# Patient Record
Sex: FEMALE | Race: AMERICAN INDIAN OR ALASKA NATIVE
[De-identification: names, ages, dates, MRNs, and addresses within clinical notes are randomized per-mention and may not be internally consistent; named-entity substitution may affect disease eponyms.]

---

## 2019-12-29 ENCOUNTER — HOSPITAL ENCOUNTER (EMERGENCY)
Dept: HOSPITAL 5 - ED | Age: 44
Discharge: HOME | End: 2019-12-29
Payer: SELF-PAY

## 2019-12-29 VITALS — SYSTOLIC BLOOD PRESSURE: 110 MMHG | DIASTOLIC BLOOD PRESSURE: 71 MMHG

## 2019-12-29 DIAGNOSIS — J11.1: Primary | ICD-10-CM

## 2019-12-29 DIAGNOSIS — Z90.710: ICD-10-CM

## 2019-12-29 DIAGNOSIS — Z79.899: ICD-10-CM

## 2019-12-29 DIAGNOSIS — F17.200: ICD-10-CM

## 2019-12-29 PROCEDURE — 71046 X-RAY EXAM CHEST 2 VIEWS: CPT

## 2019-12-29 NOTE — EMERGENCY DEPARTMENT REPORT
Upper Respiratory HPI





- HPI


Chief Complaint: Upper Respiratory Infection


Stated Complaint: FLU SX


Time Seen by Provider: 12/29/19 11:41


Duration: 4 Days


URI Symptoms: Rhinorrhea: Yes, Sore Throat: No, Ear Pain: No, Cough: Yes, 

Shortness of Breath: No, Sick Contacts: No, Unable to Take Fluids: No, Urine 

Output Abnormal: No, Listless Behavior: No


Other History: This is a 44-year-old female nontoxic, well nourished in 

appearance, no acute signs of distress presents to the ED with c/o of productive

cough, fever, chills, body aches, rhinorrhea, nasal congestion x4 days.  Patient

describes productive cough as yellow mucus production.  Patient denies any sick 

contacts.  Patient denies any recent travels, long car, recent hospital stays.  

Patient denies any calf pain or calf tenderness.  Patient denies any chest pain,

short of breath, fever, chills, nausea, vomiting, hemoptysis, numbness, 

tingling, headache or stiff neck.  Patient denies any allergies to significant 

past medical history.





- Home Meds and Allergies


Home Medications: 


                                  Previous Rx's











 Medication  Instructions  Recorded  Last Taken  Type


 


Acetaminophen/Codeine [Tylenol #3] 1 tab PO Q6H PRN #20 tab 11/06/15 Unknown Rx


 


Cyclobenzaprine [Flexeril] 10 mg PO TID PRN #20 tablet 11/06/15 Unknown Rx


 


Benzonatate [Tessalon Perles] 100 mg PO Q8HR PRN #20 capsule 12/29/19 Unknown Rx


 


Ibuprofen [Motrin] 600 mg PO Q8H PRN #20 tablet 12/29/19 Unknown Rx











Allergies/Adverse Reactions: 


                                    Allergies











Allergy/AdvReac Type Severity Reaction Status Date / Time


 


No Known Allergies Allergy   Verified 12/29/19 07:23














ED Review of Systems


ROS: 


Stated complaint: FLU SX


Other details as noted in HPI





Constitutional: denies: chills, fever


Eyes: denies: eye pain, eye discharge, vision change


ENT: congestion.  denies: ear pain, throat pain


Respiratory: cough.  denies: shortness of breath, wheezing


Cardiovascular: denies: chest pain, palpitations


Endocrine: no symptoms reported


Gastrointestinal: denies: abdominal pain, nausea, diarrhea


Genitourinary: denies: urgency, dysuria, discharge


Musculoskeletal: denies: back pain, joint swelling, arthralgia


Skin: denies: rash, lesions


Neurological: denies: headache, weakness, paresthesias


Psychiatric: denies: anxiety, depression


Hematological/Lymphatic: denies: easy bleeding, easy bruising





ED Past Medical Hx





- Past Medical History


Previous Medical History?: No





- Surgical History


Additional Surgical History: Hysterectomy, D&C





- Social History


Smoking Status: Current Every Day Smoker


Substance Use Type: None





- Medications


Home Medications: 


                                Home Medications











 Medication  Instructions  Recorded  Confirmed  Last Taken  Type


 


Acetaminophen/Codeine [Tylenol #3] 1 tab PO Q6H PRN #20 tab 11/06/15  Unknown Rx


 


Cyclobenzaprine [Flexeril] 10 mg PO TID PRN #20 tablet 11/06/15  Unknown Rx


 


Benzonatate [Tessalon Perles] 100 mg PO Q8HR PRN #20 capsule 12/29/19  Unknown 

Rx


 


Ibuprofen [Motrin] 600 mg PO Q8H PRN #20 tablet 12/29/19  Unknown Rx














ED Bronchiolitis Physical Exam





- Exam


General: 


Vital signs noted. No distress. Alert and acting appropriately.





Neurologic: 


Alert and oriented, no deficits.








Musculoskeletal: 


Unremarkable.











ED Physical Exam





- General


Limitations: No Limitations


General appearance: alert, in no apparent distress





- Head


Head exam: Present: atraumatic, normocephalic





- ENT


ENT exam: Present: normal exam, normal orophraynx





- Neck


Neck exam: Present: normal inspection, full ROM.  Absent: tenderness, 

meningismus, lymphadenopathy





- Respiratory


Respiratory exam: Present: normal lung sounds bilaterally.  Absent: respiratory 

distress, wheezes, rales, rhonchi, stridor, chest wall tenderness, accessory 

muscle use, decreased breath sounds, prolonged expiratory





- Cardiovascular


Cardiovascular Exam: Present: regular rate, normal rhythm, normal heart sounds. 

 Absent: bradycardia, tachycardia, irregular rhythm, systolic murmur, diastolic 

murmur, rubs, gallop





- Extremities Exam


Extremities exam: Present: normal inspection, full ROM





- Back Exam


Back exam: Present: normal inspection, full ROM.  Absent: tenderness, CVA 

tenderness (R), CVA tenderness (L), muscle spasm, paraspinal tenderness, 

vertebral tenderness, rash noted





- Neurological Exam


Neurological exam: Present: alert, oriented X3, normal gait





- Psychiatric


Psychiatric exam: Present: normal affect, normal mood





- Skin


Skin exam: Present: warm, dry, intact, normal color.  Absent: rash





ED Course


                                   Vital Signs











  12/29/19 12/29/19





  07:25 11:45


 


Temperature 98.9 F 98.7 F


 


Pulse Rate 88 89


 


Respiratory 18 16





Rate  


 


Blood Pressure 92/66 


 


Blood Pressure  110/71





[Left]  


 


O2 Sat by Pulse 100 98





Oximetry  














- Reevaluation(s)


Reevaluation #1: 





12/29/19 12:50


Patient is speaking in full sentences with no signs of distress noted.





ED Medical Decision Making





- Medical Decision Making





This is a 44-year-old female that presents with influenza like symptoms.  

Patient is stable and was examined by me.  Chest x-ray has been obtained and 

dictated by radiologist with normal exam.  Patient is notified of x-ray results 

with no questions noted.  Patient is currently passed the time time a flu.  

Patient was instructed to supportive care.  Patient was instructed to increase 

hydration, rest and take Motrin for fever episodes.  Patient received motrin and

 tesslone perrls in the ED.  Vitals stable. Patient is nonfebrile and normal 

heart rate. Patient was instructed Follow-up with a primary care doctor in 3-5 

days or if symptoms worsen and continue return to emergency room as soon as 

possible.  At time time of discharge, the patient does not seem toxic or ill in 

appearance.  No acute signs of distress noted.  Patient agrees to discharge 

treatment plan of care.  No further questions noted by the patient.


Critical care attestation.: 


If time is entered above; I have spent that time in minutes in the direct care 

of this critically ill patient, excluding procedure time.








ED Disposition


Clinical Impression: 


 Influenza





Disposition: DC-01 TO HOME OR SELFCARE


Is pt being admited?: No


Does the pt Need Aspirin: No


Condition: Stable


Instructions:  Influenza (ED)


Additional Instructions: 


Follow-up with a primary care doctor in 3-5 days or if symptoms worsen and 

continue return to emergency room as soon as possible. 





Increased rest, hydration, and take Motrin/Tylenol as prescribed for fever 

episode.


Prescriptions: 


Ibuprofen [Motrin] 600 mg PO Q8H PRN #20 tablet


 PRN Reason: Pain


Benzonatate [Tessalon Perles] 100 mg PO Q8HR PRN #20 capsule


 PRN Reason: Cough


Referrals: 


PRIMARY MD [facility] [Primary Care Provider] - 3-5 Days


ANTONY CARSON MD [Staff Physician] - 3-5 Days


[facility] [Outside] - 3-5 Days


Forms:  Work/School Release Form(ED)

## 2019-12-29 NOTE — XRAY REPORT
CHEST PA AND LATERAL VIEWS



INDICATION: 

MAIN: cough FOR 5 DAYS.



COMPARISON: 

None.



FINDINGS:



Support devices: None.



Heart: Within normal limits. 



Lungs/Pleura: No consolidation is seen. There is mild peribronchial cuffing Route. No pleural abnorma
lity.  







IMPRESSION:

1. Peribronchial cuffing bilaterally can be seen in the setting of lower airways disease, correlate c
linically. No consolidation is seen.



Signer Name: Yuri Montgomery MD 

Signed: 12/29/2019 12:06 PM

 Workstation Name: [id number]

## 2022-09-04 ENCOUNTER — HOSPITAL ENCOUNTER (EMERGENCY)
Dept: HOSPITAL 5 - ED | Age: 47
Discharge: HOME | End: 2022-09-04
Payer: COMMERCIAL

## 2022-09-04 VITALS — DIASTOLIC BLOOD PRESSURE: 80 MMHG | SYSTOLIC BLOOD PRESSURE: 152 MMHG

## 2022-09-04 DIAGNOSIS — S39.012A: Primary | ICD-10-CM
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
## 2022-09-04 DIAGNOSIS — E11.9: ICD-10-CM
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
## 2022-09-04 DIAGNOSIS — F17.200: ICD-10-CM

## 2022-09-04 PROCEDURE — 99283 EMERGENCY DEPT VISIT LOW MDM: CPT

## 2022-09-04 PROCEDURE — 96372 THER/PROPH/DIAG INJ SC/IM: CPT

## 2022-09-04 NOTE — EMERGENCY DEPARTMENT REPORT
ED Back Pain/Injury HPI





- General


Chief Complaint: Back Pain/Injury


Stated Complaint: BACK PAIN


Time Seen by Provider: 09/04/22 10:07


Source: patient, EMS


Limitations: No Limitations





- History of Present Illness


Initial Comments: 





This is a 47-year-old female nontoxic, well nourished in appearance, no acute 

signs of distress presents to the ED with c/o of acute on chronic lower back 

pain.  Patient stated that the past 2 days she was moving and developed this 

pain.  Patient states has history of sciatica nerve pain which is similar 

symptoms as today.  Patient states that pain radiates through to his right lower

extremity.  Patient denies any trauma.  Denies any bladder or bowel instability.

 Patient denies any urinary symptoms.  Denies any fever, chills, nausea, 

vomiting, headache, stiff neck, chest pain or shortness of breath.  Patient 

denies any numbness or tingling.  Denies any allergies. 


MD Complaint: back pain


-: days(s)


Similar Symptoms Previously: Yes


Place: work


Radiation: right leg


Severity: mild


Severity scale (0 -10): 8


Quality: burning, aching


Consistency: constant


Improves With: immobilization, sitting upright


Worsens With: movement, walking


Context: while lifting, turning/twisting


Associated Symptoms: denies other symptoms.  denies: confusion, weakness, chest 

pain, numbness, difficulty walking, cough, difficulty urinating, diaphoresis, 

incontinence, fever/chills, constipation, headaches, abdominal pain, loss of 

appetite, malaise, nausea/vomiting, rash, seizure, shortness of breath, syncope





- Related Data


                                  Previous Rx's











 Medication  Instructions  Recorded  Last Taken  Type


 


Acetaminophen/Codeine [Tylenol #3] 1 tab PO Q6H PRN #20 tab 11/06/15 Unknown Rx


 


Cyclobenzaprine [Flexeril] 10 mg PO TID PRN #20 tablet 11/06/15 Unknown Rx


 


Benzonatate [Tessalon Perles] 100 mg PO Q8HR PRN #20 capsule 12/29/19 Unknown Rx


 


Ibuprofen [Motrin] 600 mg PO Q8H PRN #20 tablet 12/29/19 Unknown Rx


 


Cyclobenzaprine [Flexeril] 10 mg PO QHS PRN #10 tab 09/04/22 Unknown Rx


 


Naproxen 500 mg PO Q12H PRN #12 tab 09/04/22 Unknown Rx











                                    Allergies











Allergy/AdvReac Type Severity Reaction Status Date / Time


 


No Known Allergies Allergy   Verified 12/29/19 07:23














ED Review of Systems


ROS: 


Stated complaint: BACK PAIN


Other details as noted in HPI





Comment: All other systems reviewed and negative


Constitutional: denies: chills, fever


Eyes: denies: eye pain, eye discharge, vision change


ENT: denies: ear pain, throat pain


Respiratory: denies: cough, shortness of breath, wheezing


Cardiovascular: denies: chest pain, palpitations


Endocrine: no symptoms reported


Gastrointestinal: denies: abdominal pain, nausea, diarrhea


Genitourinary: denies: urgency, dysuria, discharge


Musculoskeletal: back pain.  denies: joint swelling, arthralgia


Skin: denies: rash, lesions


Neurological: denies: headache, weakness, paresthesias


Psychiatric: denies: anxiety, depression


Hematological/Lymphatic: denies: easy bleeding, easy bruising





ED Past Medical Hx





- Past Medical History


Hx Diabetes: Yes





- Surgical History


Additional Surgical History: Hysterectomy, D&C





- Social History


Smoking Status: Current Every Day Smoker


Substance Use Type: Alcohol





- Medications


Home Medications: 


                                Home Medications











 Medication  Instructions  Recorded  Confirmed  Last Taken  Type


 


Acetaminophen/Codeine [Tylenol #3] 1 tab PO Q6H PRN #20 tab 11/06/15  Unknown Rx


 


Cyclobenzaprine [Flexeril] 10 mg PO TID PRN #20 tablet 11/06/15  Unknown Rx


 


Benzonatate [Tessalon Perles] 100 mg PO Q8HR PRN #20 capsule 12/29/19  Unknown 

Rx


 


Ibuprofen [Motrin] 600 mg PO Q8H PRN #20 tablet 12/29/19  Unknown Rx


 


Cyclobenzaprine [Flexeril] 10 mg PO QHS PRN #10 tab 09/04/22  Unknown Rx


 


Naproxen 500 mg PO Q12H PRN #12 tab 09/04/22  Unknown Rx














ED Physical Exam





- General


Limitations: No Limitations


General appearance: alert, in no apparent distress





- Head


Head exam: Present: atraumatic, normocephalic





- Eye


Eye exam: Present: normal appearance





- Neck


Neck exam: Present: normal inspection, full ROM.  Absent: lymphadenopathy





- Respiratory


Respiratory exam: Absent: respiratory distress





- Cardiovascular


Cardiovascular Exam: Present: regular rate





- GI/Abdominal


GI/Abdominal exam: Present: soft.  Absent: distended, tenderness





- Extremities Exam


Extremities exam: Present: normal inspection, full ROM, normal capillary refill.

  Absent: tenderness, pedal edema, joint swelling, calf tenderness





- Back Exam


Back exam: Present: normal inspection, full ROM, paraspinal tenderness (right 

lumbar paraspinal).  Absent: tenderness, CVA tenderness (R), CVA tenderness (L),

 muscle spasm, vertebral tenderness, rash noted





- Neurological Exam


Neurological exam: Present: alert, oriented X3, normal gait





- Psychiatric


Psychiatric exam: Present: normal affect, normal mood





- Skin


Skin exam: Present: warm, dry, intact, normal color.  Absent: rash





ED Course





                                   Vital Signs











  09/04/22





  08:06


 


Temperature 97.9 F


 


Pulse Rate 88


 


Respiratory 18





Rate 


 


Blood Pressure 136/80





[Left] 


 


O2 Sat by Pulse 98





Oximetry 














- Reevaluation(s)


Reevaluation #1: 





09/04/22 11:23


Patient is speaking in full sentences with no signs of distress noted.





ED Medical Decision Making





- Medical Decision Making





This is a 47-year-old female that presents with low back strain with sciatica.  

Patient is stable was examined by me.  There is no spinal tenderness.  There is 

no cauda equina syndrome during examination.  No bladder or bowel instability. 

Patient received Toradol 60 mg IM and Flexeril in the ED which stated that her 

symptoms has resolved and subsided.  Patient is discharged with muscle relaxant 

and naproxen.  Patient was instructed not to operate any machinery while taking 

muscle relaxant as they cause her drowsiness.  Patient was referred to Follow-up

 with a primary care doctor in 3-5 days or if symptoms worsen and continue 

return to emergency room as soon as possible.  At time of discharge, the patient

 does not seem toxic or ill in appearance.  No acute signs of distress noted.  

Patient agrees to discharge treatment plan of care.  No further questions noted 

by the patient.





This chart is dictated with using Dragon Dictation Program


Critical care attestation.: 


If time is entered above; I have spent that time in minutes in the direct care 

of this critically ill patient, excluding procedure time.








ED Disposition


Clinical Impression: 


Low back strain


Qualifiers:


 Encounter type: initial encounter Qualified Code(s): S39.012A - Strain of 

muscle, fascia and tendon of lower back, initial encounter





Disposition: 01 HOME / SELF CARE / HOMELESS


Is pt being admited?: No


Does the pt Need Aspirin: No


Condition: Stable


Instructions:  Cyclobenzaprine tablets, Lumbosacral Strain


Additional Instructions: 


Follow-up with your primary care doctor in 3-5 days or if symptoms worsen such 

as bladder or bowel stability, chest pain, short of breath, numbness or tingling

 sensation in extremities, headache, dizziness, visual changes, nausea vomiting,

 or abdominal pain, return back to emergency room as was possible.





Take naproxen and Flexeril as prescribed.  Do not operate heavy machinery while 

taking Flexeril due to sedation


Prescriptions: 


Cyclobenzaprine [Flexeril] 10 mg PO QHS PRN #10 tab


 PRN Reason: Muscle Spasm


Naproxen 500 mg PO Q12H PRN #12 tab


 PRN Reason: Pain , Severe (7-10)


Referrals: 


PRIMARY CARE,MD [Referring] - 3-5 Days


ANTONY CARSON MD [Staff Physician] - 3-5 Days


Forms:  Work/School Release Form(ED)


Time of Disposition: 11:25